# Patient Record
Sex: MALE | Race: WHITE | URBAN - METROPOLITAN AREA
[De-identification: names, ages, dates, MRNs, and addresses within clinical notes are randomized per-mention and may not be internally consistent; named-entity substitution may affect disease eponyms.]

---

## 2020-08-02 ENCOUNTER — INPATIENT (INPATIENT)
Facility: HOSPITAL | Age: 55
LOS: 1 days | Discharge: HOME | DRG: 603 | End: 2020-08-04
Attending: FAMILY MEDICINE | Admitting: FAMILY MEDICINE
Payer: COMMERCIAL

## 2020-08-02 VITALS — WEIGHT: 190.04 LBS | HEIGHT: 70 IN

## 2020-08-02 DIAGNOSIS — L03.114 CELLULITIS OF LEFT UPPER LIMB: ICD-10-CM

## 2020-08-02 LAB
ALBUMIN SERPL ELPH-MCNC: 4.1 G/DL — SIGNIFICANT CHANGE UP (ref 3.3–5)
ALP SERPL-CCNC: 68 U/L — SIGNIFICANT CHANGE UP (ref 40–120)
ALT FLD-CCNC: 63 U/L — SIGNIFICANT CHANGE UP (ref 12–78)
ANION GAP SERPL CALC-SCNC: 7 MMOL/L — SIGNIFICANT CHANGE UP (ref 5–17)
APTT BLD: 37.6 SEC — HIGH (ref 27.5–35.5)
AST SERPL-CCNC: 26 U/L — SIGNIFICANT CHANGE UP (ref 15–37)
BASOPHILS # BLD AUTO: 0.03 K/UL — SIGNIFICANT CHANGE UP (ref 0–0.2)
BASOPHILS NFR BLD AUTO: 0.2 % — SIGNIFICANT CHANGE UP (ref 0–2)
BILIRUB SERPL-MCNC: 0.9 MG/DL — SIGNIFICANT CHANGE UP (ref 0.2–1.2)
BUN SERPL-MCNC: 17 MG/DL — SIGNIFICANT CHANGE UP (ref 7–23)
CALCIUM SERPL-MCNC: 9 MG/DL — SIGNIFICANT CHANGE UP (ref 8.5–10.1)
CHLORIDE SERPL-SCNC: 103 MMOL/L — SIGNIFICANT CHANGE UP (ref 96–108)
CO2 SERPL-SCNC: 27 MMOL/L — SIGNIFICANT CHANGE UP (ref 22–31)
CREAT SERPL-MCNC: 1.17 MG/DL — SIGNIFICANT CHANGE UP (ref 0.5–1.3)
EOSINOPHIL # BLD AUTO: 0.15 K/UL — SIGNIFICANT CHANGE UP (ref 0–0.5)
EOSINOPHIL NFR BLD AUTO: 1.1 % — SIGNIFICANT CHANGE UP (ref 0–6)
ERYTHROCYTE [SEDIMENTATION RATE] IN BLOOD: 7 MM/HR — SIGNIFICANT CHANGE UP (ref 0–20)
GLUCOSE SERPL-MCNC: 92 MG/DL — SIGNIFICANT CHANGE UP (ref 70–99)
HCT VFR BLD CALC: 44.5 % — SIGNIFICANT CHANGE UP (ref 39–50)
HGB BLD-MCNC: 15.4 G/DL — SIGNIFICANT CHANGE UP (ref 13–17)
IMM GRANULOCYTES NFR BLD AUTO: 0.4 % — SIGNIFICANT CHANGE UP (ref 0–1.5)
INR BLD: 1.04 RATIO — SIGNIFICANT CHANGE UP (ref 0.88–1.16)
LACTATE SERPL-SCNC: 1.3 MMOL/L — SIGNIFICANT CHANGE UP (ref 0.7–2)
LYMPHOCYTES # BLD AUTO: 1.65 K/UL — SIGNIFICANT CHANGE UP (ref 1–3.3)
LYMPHOCYTES # BLD AUTO: 11.9 % — LOW (ref 13–44)
MCHC RBC-ENTMCNC: 31.5 PG — SIGNIFICANT CHANGE UP (ref 27–34)
MCHC RBC-ENTMCNC: 34.6 GM/DL — SIGNIFICANT CHANGE UP (ref 32–36)
MCV RBC AUTO: 91 FL — SIGNIFICANT CHANGE UP (ref 80–100)
MONOCYTES # BLD AUTO: 1 K/UL — HIGH (ref 0–0.9)
MONOCYTES NFR BLD AUTO: 7.2 % — SIGNIFICANT CHANGE UP (ref 2–14)
NEUTROPHILS # BLD AUTO: 10.95 K/UL — HIGH (ref 1.8–7.4)
NEUTROPHILS NFR BLD AUTO: 79.2 % — HIGH (ref 43–77)
PLATELET # BLD AUTO: 206 K/UL — SIGNIFICANT CHANGE UP (ref 150–400)
POTASSIUM SERPL-MCNC: 4.5 MMOL/L — SIGNIFICANT CHANGE UP (ref 3.5–5.3)
POTASSIUM SERPL-SCNC: 4.5 MMOL/L — SIGNIFICANT CHANGE UP (ref 3.5–5.3)
PROT SERPL-MCNC: 7.6 GM/DL — SIGNIFICANT CHANGE UP (ref 6–8.3)
PROTHROM AB SERPL-ACNC: 12.1 SEC — SIGNIFICANT CHANGE UP (ref 10.6–13.6)
RBC # BLD: 4.89 M/UL — SIGNIFICANT CHANGE UP (ref 4.2–5.8)
RBC # FLD: 11.8 % — SIGNIFICANT CHANGE UP (ref 10.3–14.5)
SARS-COV-2 RNA SPEC QL NAA+PROBE: SIGNIFICANT CHANGE UP
SODIUM SERPL-SCNC: 137 MMOL/L — SIGNIFICANT CHANGE UP (ref 135–145)
WBC # BLD: 13.83 K/UL — HIGH (ref 3.8–10.5)
WBC # FLD AUTO: 13.83 K/UL — HIGH (ref 3.8–10.5)

## 2020-08-02 PROCEDURE — 73130 X-RAY EXAM OF HAND: CPT | Mod: 26,LT

## 2020-08-02 PROCEDURE — 80048 BASIC METABOLIC PNL TOTAL CA: CPT

## 2020-08-02 PROCEDURE — 93010 ELECTROCARDIOGRAM REPORT: CPT

## 2020-08-02 PROCEDURE — 85027 COMPLETE CBC AUTOMATED: CPT

## 2020-08-02 PROCEDURE — 71045 X-RAY EXAM CHEST 1 VIEW: CPT | Mod: 26

## 2020-08-02 PROCEDURE — 99223 1ST HOSP IP/OBS HIGH 75: CPT

## 2020-08-02 PROCEDURE — 86803 HEPATITIS C AB TEST: CPT

## 2020-08-02 PROCEDURE — 36415 COLL VENOUS BLD VENIPUNCTURE: CPT

## 2020-08-02 PROCEDURE — 85025 COMPLETE CBC W/AUTO DIFF WBC: CPT

## 2020-08-02 RX ORDER — DEXTROAMPHETAMINE SACCHARATE, AMPHETAMINE ASPARTATE, DEXTROAMPHETAMINE SULFATE AND AMPHETAMINE SULFATE 1.875; 1.875; 1.875; 1.875 MG/1; MG/1; MG/1; MG/1
1 TABLET ORAL
Qty: 0 | Refills: 0 | DISCHARGE

## 2020-08-02 RX ORDER — ACETAMINOPHEN 500 MG
650 TABLET ORAL ONCE
Refills: 0 | Status: COMPLETED | OUTPATIENT
Start: 2020-08-02 | End: 2020-08-02

## 2020-08-02 RX ORDER — ARIPIPRAZOLE 15 MG/1
1 TABLET ORAL
Qty: 0 | Refills: 0 | DISCHARGE

## 2020-08-02 RX ORDER — AMPICILLIN SODIUM AND SULBACTAM SODIUM 250; 125 MG/ML; MG/ML
3 INJECTION, POWDER, FOR SUSPENSION INTRAMUSCULAR; INTRAVENOUS ONCE
Refills: 0 | Status: COMPLETED | OUTPATIENT
Start: 2020-08-02 | End: 2020-08-02

## 2020-08-02 RX ORDER — FLUOXETINE HCL 10 MG
1 CAPSULE ORAL
Qty: 0 | Refills: 0 | DISCHARGE

## 2020-08-02 RX ORDER — AMPICILLIN SODIUM AND SULBACTAM SODIUM 250; 125 MG/ML; MG/ML
3 INJECTION, POWDER, FOR SUSPENSION INTRAMUSCULAR; INTRAVENOUS EVERY 6 HOURS
Refills: 0 | Status: DISCONTINUED | OUTPATIENT
Start: 2020-08-02 | End: 2020-08-04

## 2020-08-02 RX ORDER — HEPARIN SODIUM 5000 [USP'U]/ML
5000 INJECTION INTRAVENOUS; SUBCUTANEOUS EVERY 8 HOURS
Refills: 0 | Status: DISCONTINUED | OUTPATIENT
Start: 2020-08-02 | End: 2020-08-04

## 2020-08-02 RX ADMIN — AMPICILLIN SODIUM AND SULBACTAM SODIUM 3 GRAM(S): 250; 125 INJECTION, POWDER, FOR SUSPENSION INTRAMUSCULAR; INTRAVENOUS at 16:43

## 2020-08-02 RX ADMIN — Medication 650 MILLIGRAM(S): at 22:30

## 2020-08-02 RX ADMIN — Medication 650 MILLIGRAM(S): at 21:22

## 2020-08-02 RX ADMIN — AMPICILLIN SODIUM AND SULBACTAM SODIUM 200 GRAM(S): 250; 125 INJECTION, POWDER, FOR SUSPENSION INTRAMUSCULAR; INTRAVENOUS at 23:33

## 2020-08-02 RX ADMIN — AMPICILLIN SODIUM AND SULBACTAM SODIUM 200 GRAM(S): 250; 125 INJECTION, POWDER, FOR SUSPENSION INTRAMUSCULAR; INTRAVENOUS at 16:12

## 2020-08-02 RX ADMIN — HEPARIN SODIUM 5000 UNIT(S): 5000 INJECTION INTRAVENOUS; SUBCUTANEOUS at 21:24

## 2020-08-02 NOTE — H&P ADULT - ASSESSMENT
56 yo male with Hx. of depression, who was bitten on L hand by his ped cat 4 days ago. He developed  redness , pain , swelling  on the L forearm  and got worse today.  assessment Dx:                       1. L arm cellulitis                       2. cat bite                       3. Anxiety                       4. ADHD    Plan:    admit to medicine                medications: started on Unasyn, keep L arm elevated on pillow.               VTEP: heparin               Labs: cbc,cmp               Radiology:                Cardiac diagnostics: EKG               Consults: ID

## 2020-08-02 NOTE — ED ADULT TRIAGE NOTE - CHIEF COMPLAINT QUOTE
Patient comes in with cat bite on left hand. Patient p/w swelling, pain, redness, and generalized fatigue since cat bite on Wednesday. Patient denies fevers.

## 2020-08-02 NOTE — H&P ADULT - HISTORY OF PRESENT ILLNESS
HPI: The patient is a 54 yo male with Hx. of depression, who was bitten on L hand by his ped cat 4 days ago. He developed  redness , pain , swelling  on the L forear and got worse today.    PMHx: bipolar disorder,     PSHx: denies surgeries in the past.     Family Hx: father  from renal failure ,mother is 86 yo and had diabetes.    Social Hx.: not smoking, no alcohol use    ROS:   Eyes: no changes in vision    ENT/Mouth: no changes    Cardiovascular: no chest pain    Respiratory: no SOB    Gastrointestinal: no diarrhea, no nausea, no vomiting    Genitourinary: no dysuria    Breast: no pain    Musculoskeletal: no pain    Integumentary:  has  rash, swelling itching L forearm    Neurological: No Headache, no tremor,    Psychiatric: no suicidal ideations    Endocrine: no excessive thirst,     Hematologic/Lymphatic: no swollen glands    Allergic/Immunologic: no itching      Physical Exam: Vital Signs Last 24 Hrs  T(C): 37.2 (02 Aug 2020 21:03), Max: 37.4 (02 Aug 2020 18:29)  T(F): 99 (02 Aug 2020 21:03), Max: 99.3 (02 Aug 2020 18:29)  HR: 76 (02 Aug 2020 21:03) (75 - 86)  BP: 123/78 (02 Aug 2020 21:03) (114/74 - 123/78)  BP(mean): 94 (02 Aug 2020 14:49) (94 - 94)  RR: 18 (02 Aug 2020 21:03) (18 - 18)  SpO2: 98% (02 Aug 2020 21:03) (98% - 98%)        HEENT: PRRL EOMI    MOUTH/TEETH/GUMS: Clear    NECK: no JVD    LUNGS: Clear    HEART: S1,S2 RR    ABDOMEN: soft nontender    EXTREMITIES:  L hand bite will, swelling, redness going up on L forearm,  no pedal edema    MUSCULOSKELETAL: no joint swelling     NEURO: no tremor, no focal signs.    SKIN: no rash    : CVA negative,     Lab:                       15.4   13.83 )-----------( 206      ( 02 Aug 2020 15:54 )             44.5       08-02    137  |  103  |  17  ----------------------------<  92  4.5   |  27  |  1.17    Ca    9.0      02 Aug 2020 15:54    TPro  7.6  /  Alb  4.1  /  TBili  0.9  /  DBili  x   /  AST  26  /  ALT  63  /  AlkPhos  68   HPI: The patient is a 54 yo male with Hx. of depression, who was bitten on L hand by his ped cat 4 days ago. He developed  redness , pain , swelling  on the L forear and got worse today.    PMHx: bipolar disorder,  He had Tetanus shot given by  his PCP this year.    PSHx: denies surgeries in the past.     Family Hx: father  from renal failure ,mother is 86 yo and had diabetes.    Social Hx.: not smoking, no alcohol use    ROS:   Eyes: no changes in vision    ENT/Mouth: no changes    Cardiovascular: no chest pain    Respiratory: no SOB    Gastrointestinal: no diarrhea, no nausea, no vomiting    Genitourinary: no dysuria    Breast: no pain    Musculoskeletal: no pain    Integumentary:  has  rash, swelling itching L forearm    Neurological: No Headache, no tremor,    Psychiatric: no suicidal ideations    Endocrine: no excessive thirst,     Hematologic/Lymphatic: no swollen glands    Allergic/Immunologic: no itching      Physical Exam: Vital Signs Last 24 Hrs  T(C): 37.2 (02 Aug 2020 21:03), Max: 37.4 (02 Aug 2020 18:29)  T(F): 99 (02 Aug 2020 21:03), Max: 99.3 (02 Aug 2020 18:29)  HR: 76 (02 Aug 2020 21:03) (75 - 86)  BP: 123/78 (02 Aug 2020 21:03) (114/74 - 123/78)  BP(mean): 94 (02 Aug 2020 14:49) (94 - 94)  RR: 18 (02 Aug 2020 21:03) (18 - 18)  SpO2: 98% (02 Aug 2020 21:03) (98% - 98%)        HEENT: PRRL EOMI    MOUTH/TEETH/GUMS: Clear    NECK: no JVD    LUNGS: Clear    HEART: S1,S2 RR    ABDOMEN: soft nontender    EXTREMITIES:  L hand bite will, swelling, redness going up on L forearm,  no pedal edema    MUSCULOSKELETAL: no joint swelling     NEURO: no tremor, no focal signs.    SKIN: no rash    : CVA negative,     Lab:                       15.4   13.83 )-----------( 206      ( 02 Aug 2020 15:54 )             44.5       08-02    137  |  103  |  17  ----------------------------<  92  4.5   |  27  |  1.17    Ca    9.0      02 Aug 2020 15:54    TPro  7.6  /  Alb  4.1  /  TBili  0.9  /  DBili  x   /  AST  26  /  ALT  63  /  AlkPhos  68  -

## 2020-08-02 NOTE — ED STATDOCS - CLINICAL SUMMARY MEDICAL DECISION MAKING FREE TEXT BOX
Plan IV Abx, labs and reassessment. Will discuss possibility of infection. Plan IV Abx, labs and reassessment. Will discuss possibility of admission for worsening infection.

## 2020-08-02 NOTE — ED STATDOCS - OBJECTIVE STATEMENT
55 YOM no PMHx presents to the ED c/o progressively worsening redness, swelling and pain s/p pet cat bite 7/29. Cat bite to left hypothenar eminence. Pt is not on any Abx. Evaluated at urgent care today and sent to [facility] for further care and evaluation. Cat's vaccinations are UTD. Pt additionally presenting with fatigue and "feeling dizzy." No fevers. Denies CP, SOB. No cough.

## 2020-08-02 NOTE — ED ADULT NURSE NOTE - OBJECTIVE STATEMENT
Pt comes in for cat bite to left hand from Wedensday. Complains of pain, redness, and swelling. denies fever

## 2020-08-02 NOTE — CONSULT NOTE ADULT - SUBJECTIVE AND OBJECTIVE BOX
55M with no PMH presents to the ED following a cat bite to the L hypothenar eminence. The pt states that his own cat bite him after trying to grab it on 7/29. Since then the pt has felt pain and noticed redness in the area of the hypothenar eminence, getting progressively worse. He did not take antibiotics prior to coming to the ED. 55M with no PMH presents to the ED following a cat bite to the L hypothenar eminence. The pt states that his own cat bite him after trying to grab it on 7/29. Since then the pt has felt pain and noticed redness in the area of the hypothenar eminence, getting progressively worse. He did not take antibiotics prior to coming to the ED. Pt states that the redness is progressing up the arm. Pt denies fever, chills, N/V.    PAST MEDICAL & SURGICAL HISTORY:  Denies    Allergies    No Known Allergies    Vital Signs Last 24 Hrs  T(C): 37.4 (02 Aug 2020 18:29), Max: 37.4 (02 Aug 2020 18:29)  T(F): 99.3 (02 Aug 2020 18:29), Max: 99.3 (02 Aug 2020 18:29)  HR: 75 (02 Aug 2020 18:29) (75 - 86)  BP: 114/74 (02 Aug 2020 18:29) (114/74 - 120/83)  BP(mean): 94 (02 Aug 2020 14:49) (94 - 94)  RR: 18 (02 Aug 2020 18:29) (18 - 18)  SpO2: 98% (02 Aug 2020 18:29) (98% - 98%)    PE:  Gen: NAD  LUE: Scratch marks and small healed bite marks over the hypothenar eminence all healed without purulence, no expression of fluid  Redness and swelling over the hypothenar eminence, that is tracking up the forearm  TTP over the hypothenar eminence  No TTP of the flexor sheath  AROM of digits without pain or restriction  PROM without pain  Sensation intact of ulnar and radial side of digits 4-5  Brisk cap refill  Compartments soft and compressible    XR L Hand: No fractures or dislocations present, no evidence of effusion, possible edema

## 2020-08-02 NOTE — ED STATDOCS - CARE PLAN
Principal Discharge DX:	Cellulitis of hand, left  Secondary Diagnosis:	Ascending lymphangitis  Secondary Diagnosis:	Cat bite of hand, left, initial encounter

## 2020-08-02 NOTE — ED ADULT NURSE NOTE - NSIMPLEMENTINTERV_GEN_ALL_ED
Implemented All Universal Safety Interventions:  Index to call system. Call bell, personal items and telephone within reach. Instruct patient to call for assistance. Room bathroom lighting operational. Non-slip footwear when patient is off stretcher. Physically safe environment: no spills, clutter or unnecessary equipment. Stretcher in lowest position, wheels locked, appropriate side rails in place.

## 2020-08-02 NOTE — ED STATDOCS - SKIN, MLM
+erythema from hypothenar eminence extending down to distal part of left forearm overlying the ulna, mild TTP, not fluctuant, no discharge, does have central punctum representing bite wound

## 2020-08-02 NOTE — ED STATDOCS - PROGRESS NOTE DETAILS
signed Lindsey Wisdom PA-C Pt seen initially in intake by Dr Freeman.   55M c/o redness, pain and swelling where his cat bit his left hand on 7/29. Pt states he has an indoor cat, utd on vaccines which tried to run outside, he grabbed the cat to keep it inside and it bit his left hand. No abx, has not seen a doctor yet. Tetanus up to date. PMH anxiety, depression. Denies fever. pt alert, NAD, +erythema and mild swelling of left hypothenar eminence with ascending lymphangitis to level of bicep. PMD Strogach. I spoke to hand Dr Valdez who recommends admission to medicine for IV abx and ortho resident will consult on pt in ED. Pt agrees with admission and plan of care. signed Lindsey Wisdom PA-C   Elevated WBC. No significant findings on xray. Case discussed with and admission accepted by hospitalist Dr. Lay. pt seen in ED by ortho residents. agree with admission and IV abx.

## 2020-08-03 LAB
ANION GAP SERPL CALC-SCNC: 7 MMOL/L — SIGNIFICANT CHANGE UP (ref 5–17)
BASOPHILS # BLD AUTO: 0.02 K/UL — SIGNIFICANT CHANGE UP (ref 0–0.2)
BASOPHILS NFR BLD AUTO: 0.2 % — SIGNIFICANT CHANGE UP (ref 0–2)
BUN SERPL-MCNC: 15 MG/DL — SIGNIFICANT CHANGE UP (ref 7–23)
CALCIUM SERPL-MCNC: 8.7 MG/DL — SIGNIFICANT CHANGE UP (ref 8.5–10.1)
CHLORIDE SERPL-SCNC: 104 MMOL/L — SIGNIFICANT CHANGE UP (ref 96–108)
CO2 SERPL-SCNC: 28 MMOL/L — SIGNIFICANT CHANGE UP (ref 22–31)
CREAT SERPL-MCNC: 1.18 MG/DL — SIGNIFICANT CHANGE UP (ref 0.5–1.3)
CRP SERPL-MCNC: 3.95 MG/DL — HIGH (ref 0–0.4)
EOSINOPHIL # BLD AUTO: 0.08 K/UL — SIGNIFICANT CHANGE UP (ref 0–0.5)
EOSINOPHIL NFR BLD AUTO: 0.7 % — SIGNIFICANT CHANGE UP (ref 0–6)
GLUCOSE SERPL-MCNC: 100 MG/DL — HIGH (ref 70–99)
HCT VFR BLD CALC: 46.7 % — SIGNIFICANT CHANGE UP (ref 39–50)
HCV AB S/CO SERPL IA: 0.09 S/CO — SIGNIFICANT CHANGE UP (ref 0–0.99)
HCV AB SERPL-IMP: SIGNIFICANT CHANGE UP
HGB BLD-MCNC: 15.9 G/DL — SIGNIFICANT CHANGE UP (ref 13–17)
IMM GRANULOCYTES NFR BLD AUTO: 0.4 % — SIGNIFICANT CHANGE UP (ref 0–1.5)
LYMPHOCYTES # BLD AUTO: 1.81 K/UL — SIGNIFICANT CHANGE UP (ref 1–3.3)
LYMPHOCYTES # BLD AUTO: 16.3 % — SIGNIFICANT CHANGE UP (ref 13–44)
MCHC RBC-ENTMCNC: 31.1 PG — SIGNIFICANT CHANGE UP (ref 27–34)
MCHC RBC-ENTMCNC: 34 GM/DL — SIGNIFICANT CHANGE UP (ref 32–36)
MCV RBC AUTO: 91.2 FL — SIGNIFICANT CHANGE UP (ref 80–100)
MONOCYTES # BLD AUTO: 0.71 K/UL — SIGNIFICANT CHANGE UP (ref 0–0.9)
MONOCYTES NFR BLD AUTO: 6.4 % — SIGNIFICANT CHANGE UP (ref 2–14)
NEUTROPHILS # BLD AUTO: 8.44 K/UL — HIGH (ref 1.8–7.4)
NEUTROPHILS NFR BLD AUTO: 76 % — SIGNIFICANT CHANGE UP (ref 43–77)
PLATELET # BLD AUTO: 216 K/UL — SIGNIFICANT CHANGE UP (ref 150–400)
POTASSIUM SERPL-MCNC: 4.2 MMOL/L — SIGNIFICANT CHANGE UP (ref 3.5–5.3)
POTASSIUM SERPL-SCNC: 4.2 MMOL/L — SIGNIFICANT CHANGE UP (ref 3.5–5.3)
RBC # BLD: 5.12 M/UL — SIGNIFICANT CHANGE UP (ref 4.2–5.8)
RBC # FLD: 11.8 % — SIGNIFICANT CHANGE UP (ref 10.3–14.5)
SARS-COV-2 IGG SERPL QL IA: NEGATIVE — SIGNIFICANT CHANGE UP
SARS-COV-2 IGM SERPL IA-ACNC: <0.1 INDEX — SIGNIFICANT CHANGE UP
SODIUM SERPL-SCNC: 139 MMOL/L — SIGNIFICANT CHANGE UP (ref 135–145)
WBC # BLD: 11.1 K/UL — HIGH (ref 3.8–10.5)
WBC # FLD AUTO: 11.1 K/UL — HIGH (ref 3.8–10.5)

## 2020-08-03 PROCEDURE — 99233 SBSQ HOSP IP/OBS HIGH 50: CPT

## 2020-08-03 RX ORDER — ACETAMINOPHEN 500 MG
650 TABLET ORAL EVERY 6 HOURS
Refills: 0 | Status: DISCONTINUED | OUTPATIENT
Start: 2020-08-03 | End: 2020-08-04

## 2020-08-03 RX ORDER — IBUPROFEN 200 MG
600 TABLET ORAL EVERY 8 HOURS
Refills: 0 | Status: DISCONTINUED | OUTPATIENT
Start: 2020-08-03 | End: 2020-08-04

## 2020-08-03 RX ADMIN — AMPICILLIN SODIUM AND SULBACTAM SODIUM 200 GRAM(S): 250; 125 INJECTION, POWDER, FOR SUSPENSION INTRAMUSCULAR; INTRAVENOUS at 23:30

## 2020-08-03 RX ADMIN — AMPICILLIN SODIUM AND SULBACTAM SODIUM 200 GRAM(S): 250; 125 INJECTION, POWDER, FOR SUSPENSION INTRAMUSCULAR; INTRAVENOUS at 11:18

## 2020-08-03 RX ADMIN — HEPARIN SODIUM 5000 UNIT(S): 5000 INJECTION INTRAVENOUS; SUBCUTANEOUS at 05:58

## 2020-08-03 RX ADMIN — AMPICILLIN SODIUM AND SULBACTAM SODIUM 200 GRAM(S): 250; 125 INJECTION, POWDER, FOR SUSPENSION INTRAMUSCULAR; INTRAVENOUS at 05:59

## 2020-08-03 RX ADMIN — HEPARIN SODIUM 5000 UNIT(S): 5000 INJECTION INTRAVENOUS; SUBCUTANEOUS at 22:18

## 2020-08-03 RX ADMIN — HEPARIN SODIUM 5000 UNIT(S): 5000 INJECTION INTRAVENOUS; SUBCUTANEOUS at 13:39

## 2020-08-03 RX ADMIN — AMPICILLIN SODIUM AND SULBACTAM SODIUM 200 GRAM(S): 250; 125 INJECTION, POWDER, FOR SUSPENSION INTRAMUSCULAR; INTRAVENOUS at 17:03

## 2020-08-03 NOTE — PROGRESS NOTE ADULT - SUBJECTIVE AND OBJECTIVE BOX
CC: Left arm cat bite. History of Present Illness: 	  HPI: The patient is a 56 yo male with Hx. of depression, who was bitten on L hand by his ped cat 4 days ago. He developed  redness , pain , swelling  on the L forear and got worse today.    8/3: Slow improvement in hand erythema and edema.  Pt comfortable, Denies fever, chills, N, V, abd pain, CP, SOB.    REVIEW OF SYSTEMS: All other review of systems is negative unless indicated above.    Vital Signs Last 24 Hrs  T(C): 37.2 (03 Aug 2020 09:51), Max: 37.4 (02 Aug 2020 18:29)  T(F): 99 (03 Aug 2020 09:51), Max: 99.3 (02 Aug 2020 18:29)  HR: 76 (03 Aug 2020 09:51) (75 - 86)  BP: 125/75 (03 Aug 2020 09:51) (114/74 - 125/75)  BP(mean): 94 (02 Aug 2020 14:49) (94 - 94)  RR: 18 (03 Aug 2020 09:51) (18 - 18)  SpO2: 94% (03 Aug 2020 09:51) (94% - 98%)    PHYSICAL EXAM:    Constitutional: NAD, awake and alert, well-developed  HEENT: PERR, EOMI, Normal Hearing, MMM  Neck: Soft and supple  Respiratory: Breath sounds are clear bilaterally, No wheezing, rales or rhonchi  Cardiovascular: S1 and S2, regular rate and rhythm, no Murmurs, gallops or rubs  Gastrointestinal: Bowel Sounds present, soft, nontender, nondistended, no guarding, no rebound  Extremities: left hand erythema, edema, tenderness  Neurological: A/O x 3, no focal deficits in my limited exam    MEDICATIONS  (STANDING):  ampicillin/sulbactam  IVPB 3 Gram(s) IV Intermittent every 6 hours  heparin   Injectable 5000 Unit(s) SubCutaneous every 8 hours    MEDICATIONS  (PRN):  acetaminophen   Tablet .. 650 milliGRAM(s) Oral every 6 hours PRN Temp greater or equal to 38C (100.4F), Mild Pain (1 - 3), Moderate Pain (4 - 6)  ibuprofen  Tablet. 600 milliGRAM(s) Oral every 8 hours PRN Severe Pain (7 - 10)                              15.9   11.10 )-----------( 216      ( 03 Aug 2020 07:47 )             46.7     08-03    139  |  104  |  15  ----------------------------<  100<H>  4.2   |  28  |  1.18    Ca    8.7      03 Aug 2020 07:47    TPro  7.6  /  Alb  4.1  /  TBili  0.9  /  DBili  x   /  AST  26  /  ALT  63  /  AlkPhos  68  08-02    CAPILLARY BLOOD GLUCOSE        LIVER FUNCTIONS - ( 02 Aug 2020 15:54 )  Alb: 4.1 g/dL / Pro: 7.6 gm/dL / ALK PHOS: 68 U/L / ALT: 63 U/L / AST: 26 U/L / GGT: x           PT/INR - ( 02 Aug 2020 15:54 )   PT: 12.1 sec;   INR: 1.04 ratio         PTT - ( 02 Aug 2020 15:54 )  PTT:37.6 sec        Assessment and Plan:  56 yo male with Hx. of depression, who was bitten on L hand by his ped cat 4 days ago. He developed  redness , pain , swelling  on the L forearm  and got worse today.    Left arm cellulitis due to cat bite:  -c/w IV unasyn  -f/u cultures  -ID evaluation  -xray hand no acute findings    Anxiety / ADHD:  -resume home meds    VTEP:   -heparin

## 2020-08-03 NOTE — PROGRESS NOTE ADULT - ASSESSMENT
A/P: 55M s/p Cat Bite to the L hypothenar eminence likely a local infection with possible ascending lymphangitis:      -pt doing well this am, with some improvement  -WBAT LUE  -Pain control  -Ice/Elevate to decrease swelling  -C/w IV antibiotics and monitoring at this time  -FU ID c/s, abx recs   -Marked out redness, monitor for change   -no surgical intervention at this time  -will continue to monitor symptoms  -Discussed plan with Dr Valdez and he agrees with the plan will advise if plan changes.

## 2020-08-03 NOTE — PROGRESS NOTE ADULT - SUBJECTIVE AND OBJECTIVE BOX
Pt seen and examined at bedside this am. pain is controlled, pt overall stating he feels the same as yesterday. denies fever/chill/night sweats. no other complaints at this time.         Vital Signs Last 24 Hrs  T(C): 36.6 (03 Aug 2020 00:22), Max: 37.4 (02 Aug 2020 18:29)  T(F): 97.8 (03 Aug 2020 00:22), Max: 99.3 (02 Aug 2020 18:29)  HR: 76 (02 Aug 2020 21:03) (75 - 86)  BP: 123/78 (02 Aug 2020 21:03) (114/74 - 123/78)  BP(mean): 94 (02 Aug 2020 14:49) (94 - 94)  RR: 18 (02 Aug 2020 21:03) (18 - 18)  SpO2: 98% (02 Aug 2020 21:03) (98% - 98%)      PE:     GEN: NAD     LUE: Scratch marks and small healed bite marks over the hypothenar eminence all healed without purulence, no expression of fluid  Redness and swelling over the hypothenar eminence, that is tracking up the forearm  TTP over the hypothenar eminence  No TTP of the flexor sheath  AROM of digits without pain or restriction  PROM without pain  Sensation intact of ulnar and radial side of digits 4-5  Brisk cap refill  Compartments soft and compressible    XR L Hand: No fractures or dislocations present, no evidence of effusion, possible edema

## 2020-08-03 NOTE — CONSULT NOTE ADULT - ASSESSMENT
56 yo male with Hx. of depression, who was bitten on L hand by his ped cat 4 days ago prior to admit on 8/2. He developed redness, pain, swelling on the L forearm that was noted to worsen over the past few days prompting admit to .  Here noted with wbc ct 13, was given IV unasyn.     1. L hand/forearm cellulitis with lymphangitis. s/p cat bite  - slowly improving  - agree with unasyn 3gmq6h  - f/u urine cx/blood cx  - monitor temps  - tolerating abx well so far; no side effects noted  - reason for abx use and side effects reviewed with patient  - supportive care  - fu cbc    2. other issues - care per medicine
55M s/p Cat Bite to the L hypothenar eminence likely a local infection with possible ascending lymphangitis:    -WBAT LUE  -Pain control  -Ice/Elevate to decrease swelling  -Advise admission for IV antibiotics and monitoring   -Marked out redness, monitor for change   -Discussed case and imaging with Dr Valdez  -Discussed plan with Dr Valdez and he agrees with the plan

## 2020-08-04 VITALS
TEMPERATURE: 98 F | OXYGEN SATURATION: 93 % | DIASTOLIC BLOOD PRESSURE: 63 MMHG | SYSTOLIC BLOOD PRESSURE: 100 MMHG | RESPIRATION RATE: 18 BRPM | HEART RATE: 73 BPM

## 2020-08-04 LAB
HCT VFR BLD CALC: 44.1 % — SIGNIFICANT CHANGE UP (ref 39–50)
HGB BLD-MCNC: 15.1 G/DL — SIGNIFICANT CHANGE UP (ref 13–17)
MCHC RBC-ENTMCNC: 30.8 PG — SIGNIFICANT CHANGE UP (ref 27–34)
MCHC RBC-ENTMCNC: 34.2 GM/DL — SIGNIFICANT CHANGE UP (ref 32–36)
MCV RBC AUTO: 90 FL — SIGNIFICANT CHANGE UP (ref 80–100)
PLATELET # BLD AUTO: 218 K/UL — SIGNIFICANT CHANGE UP (ref 150–400)
RBC # BLD: 4.9 M/UL — SIGNIFICANT CHANGE UP (ref 4.2–5.8)
RBC # FLD: 11.6 % — SIGNIFICANT CHANGE UP (ref 10.3–14.5)
WBC # BLD: 8.89 K/UL — SIGNIFICANT CHANGE UP (ref 3.8–10.5)
WBC # FLD AUTO: 8.89 K/UL — SIGNIFICANT CHANGE UP (ref 3.8–10.5)

## 2020-08-04 PROCEDURE — 99239 HOSP IP/OBS DSCHRG MGMT >30: CPT

## 2020-08-04 RX ORDER — IBUPROFEN 200 MG
1 TABLET ORAL
Qty: 0 | Refills: 0 | DISCHARGE
Start: 2020-08-04

## 2020-08-04 RX ORDER — POLYETHYLENE GLYCOL 3350 17 G/17G
17 POWDER, FOR SOLUTION ORAL ONCE
Refills: 0 | Status: COMPLETED | OUTPATIENT
Start: 2020-08-04 | End: 2020-08-04

## 2020-08-04 RX ADMIN — AMPICILLIN SODIUM AND SULBACTAM SODIUM 200 GRAM(S): 250; 125 INJECTION, POWDER, FOR SUSPENSION INTRAMUSCULAR; INTRAVENOUS at 05:50

## 2020-08-04 RX ADMIN — HEPARIN SODIUM 5000 UNIT(S): 5000 INJECTION INTRAVENOUS; SUBCUTANEOUS at 05:50

## 2020-08-04 RX ADMIN — POLYETHYLENE GLYCOL 3350 17 GRAM(S): 17 POWDER, FOR SOLUTION ORAL at 11:20

## 2020-08-04 NOTE — PROGRESS NOTE ADULT - SUBJECTIVE AND OBJECTIVE BOX
Pt seen and examined at bedside this am. pain is controlled, pt overall stating he feels the better this am. denies fever/chill/night sweats. no other complaints at this time.         Vital Signs Last 24 Hrs  T(C): 36.9 (04 Aug 2020 08:11), Max: 37.2 (03 Aug 2020 09:51)  T(F): 98.4 (04 Aug 2020 08:11), Max: 99 (03 Aug 2020 09:51)  HR: 73 (04 Aug 2020 08:11) (61 - 76)  BP: 100/63 (04 Aug 2020 08:11) (100/63 - 125/75)  BP(mean): --  RR: 18 (04 Aug 2020 08:11) (18 - 18)  SpO2: 93% (04 Aug 2020 08:11) (93% - 96%)      PE:     GEN: NAD     LUE: Scratch marks and small healed bite marks over the hypothenar eminence all healed without purulence, no expression of fluid  improved Redness and swelling over the hypothenar eminence, less tracking up the forearm this  morning  TTP over the hypothenar eminence  No TTP of the flexor sheath  AROM of digits without pain or restriction  PROM without pain  Sensation intact of ulnar and radial side of digits 4-5  Brisk cap refill  Compartments soft and compressible    XR L Hand: No fractures or dislocations present, no evidence of effusion, possible edema

## 2020-08-04 NOTE — PROGRESS NOTE ADULT - ASSESSMENT
A/P: 55M s/p Cat Bite to the L hypothenar eminence likely a local infection with possible ascending lymphangitis:      -pt doing well this am with improvement of redness/pain and swelling  -WBAT LUE  -Pain control  -Ice/Elevate to decrease swelling  -C/w IV antibiotics and monitoring at this time  -FU ID , final recs appreciated, DC Picc v PO abx per ID   -Marked out redness, improving   -no surgical intervention at this time  -ortho stable  -pt is improving  -will continue to monitor symptoms and advise if plan changes   -Discussed plan with Dr Valdez and he agrees with the plan will advise if plan changes.  -FU with Dr. Valdez when discharged from the hospital, call office for appointment

## 2020-08-04 NOTE — DISCHARGE NOTE PROVIDER - CARE PROVIDER_API CALL
Ritika Valdez  ORTHOPAEDIC SURGERY  [address] [place] [address]  Phone: [phone]  Fax: [phone]  Follow Up Time: 1 week

## 2020-08-04 NOTE — PROGRESS NOTE ADULT - SUBJECTIVE AND OBJECTIVE BOX
Date of service: 08-04-20 @ 10:40    pt seen and examined  feels better   L hand improving  afebrile     ROS: no fever or chills; denies dizziness, no HA, no SOB or cough, no abdominal pain, no diarrhea or constipation; no dysuria, no urinary frequency, no legs pain, no rashes    MEDICATIONS  (STANDING):  ampicillin/sulbactam  IVPB 3 Gram(s) IV Intermittent every 6 hours  heparin   Injectable 5000 Unit(s) SubCutaneous every 8 hours      Vital Signs Last 24 Hrs  T(C): 36.9 (04 Aug 2020 08:11), Max: 36.9 (04 Aug 2020 08:11)  T(F): 98.4 (04 Aug 2020 08:11), Max: 98.4 (04 Aug 2020 08:11)  HR: 73 (04 Aug 2020 08:11) (61 - 73)  BP: 100/63 (04 Aug 2020 08:11) (100/63 - 120/70)  BP(mean): --  RR: 18 (04 Aug 2020 08:11) (18 - 18)  SpO2: 93% (04 Aug 2020 08:11) (93% - 96%)    PE:  Constitutional: NAD  HEENT: NC/AT, EOMI, PERRLA, conjunctivae clear; ears and nose atraumatic; pharynx benign  Neck: supple; thyroid not palpable  Back: no tenderness  Respiratory: decreased breath sounds   Cardiovascular: S1S2 regular, no murmurs  Abdomen: soft, not tender, not distended, positive BS; liver and spleen WNL  Genitourinary: no suprapubic tenderness  Lymphatic: no LN palpable  Musculoskeletal: no muscle tenderness, no joint swelling or tenderness  Extremities: no pedal edema, L hand puncture wound with resolving erythema/warmth/tenderness  Neurological/ Psychiatric: AxOx3, Judgement and insight normal;  moving all extremities  Skin: no rashes; no palpable lesions    Labs: all available labs reviewed                                   15.1   8.89  )-----------( 218      ( 04 Aug 2020 08:25 )             44.1     08-03    139  |  104  |  15  ----------------------------<  100<H>  4.2   |  28  |  1.18    Ca    8.7      03 Aug 2020 07:47    TPro  7.6  /  Alb  4.1  /  TBili  0.9  /  DBili  x   /  AST  26  /  ALT  63  /  AlkPhos  68  08-02       Culture - Blood (08.02.20 @ 15:54)    Specimen Source: .Blood None    Culture Results:   No growth to date.      Radiology: all available radiological tests reviewed    EXAM:  XR CHEST 1 VIEW                            PROCEDURE DATE:  08/02/2020          INTERPRETATION:  cough    A frontal chest film  demonstrates grossly clear lungs.  No acute infiltrate or consolidation is  noted. The costophrenic angles are grossly clear.    The heart size and vascular markings are within normal limits for technique.    No mediastinal or hilar adenopathy is suggested. .    The osseous structures appear intact intact.    IMPRESSION:  Clear lungs.  No acute airspace disease suggested.        Advanced directives addressed: full resuscitation

## 2020-08-04 NOTE — PROGRESS NOTE ADULT - REASON FOR ADMISSION
Left arm cellulitis s/p cat bite

## 2020-08-04 NOTE — DISCHARGE NOTE PROVIDER - HOSPITAL COURSE
CC: Left arm cat bite.    History of Present Illness:     HPI: The patient is a 56 yo male with Hx. of depression, who was bitten on L hand by his ped cat 4 days ago. He developed  redness , pain , swelling  on the L forear and got worse today.        8/3: Slow improvement in hand erythema and edema.  Pt comfortable, Denies fever, chills, N, V, abd pain, CP, SOB.    8/4: Erythema, swelling greatly improving.  Xray negative for deeper infection.  Orthopedic cleared for discharge with outpatient follow up and antibiotic management.        REVIEW OF SYSTEMS: All other review of systems is negative unless indicated above.        Vital Signs Last 24 Hrs    T(C): 36.9 (04 Aug 2020 08:11), Max: 36.9 (04 Aug 2020 08:11)    T(F): 98.4 (04 Aug 2020 08:11), Max: 98.4 (04 Aug 2020 08:11)    HR: 73 (04 Aug 2020 08:11) (61 - 73)    BP: 100/63 (04 Aug 2020 08:11) (100/63 - 120/70)    BP(mean): --    RR: 18 (04 Aug 2020 08:11) (18 - 18)    SpO2: 93% (04 Aug 2020 08:11) (93% - 96%)        PHYSICAL EXAM:        Constitutional: NAD, awake and alert, well-developed    HEENT: PERR, EOMI, Normal Hearing, MMM    Neck: Soft and supple    Respiratory: Breath sounds are clear bilaterally, No wheezing, rales or rhonchi    Cardiovascular: S1 and S2, regular rate and rhythm, no Murmurs, gallops or rubs    Gastrointestinal: Bowel Sounds present, soft, nontender, nondistended, no guarding, no rebound    Extremities: left hand erythema, edema improving    Neurological: A/O x 3, no focal deficits in my limited exam        med/labs: Reviewed and interpreted         Assessment and Plan:  56 yo male with Hx. of depression, who was bitten on L hand by his ped cat 4 days ago. He developed  redness , pain , swelling  on the L forearm  and got worse today.        Left arm cellulitis due to cat bite: IMPROVING    -c/w IV unasyn and change to augmentin upon discharge to complete course per ID.    -cultures No growth    -xray hand no acute findings    -outpatient orthopedic f/u Dr. Dagly        Anxiety / ADHD:    -resume home meds        Attending Statement: 40 minutes spent on total encounter and discharge planning.

## 2020-08-04 NOTE — DISCHARGE NOTE PROVIDER - NSDCMRMEDTOKEN_GEN_ALL_CORE_FT
Abilify 2 mg oral tablet: 1 tab(s) orally once a day  Adderall XR 10 mg oral capsule, extended release: 1 cap(s) orally once a day (in the morning)  Augmentin 875 mg-125 mg oral tablet: 1  orally 2 times a day   ibuprofen 600 mg oral tablet: 1 tab(s) orally every 8 hours, As needed, Severe Pain (7 - 10)  PROzac 40 mg oral capsule: 1 cap(s) orally once a day

## 2020-08-04 NOTE — DISCHARGE NOTE PROVIDER - NSDCCPCAREPLAN_GEN_ALL_CORE_FT
PRINCIPAL DISCHARGE DIAGNOSIS  Diagnosis: Cellulitis of hand, left  Assessment and Plan of Treatment: due to cat bite = improving.  take augmentin as prescribed (sent to [facility]).

## 2020-08-04 NOTE — DISCHARGE NOTE NURSING/CASE MANAGEMENT/SOCIAL WORK - PATIENT PORTAL LINK FT
You can access the FollowMyHealth Patient Portal offered by [facility] by registering at the following website: [email or web address]. By joining [facility]’s FollowMyHealth portal, you will also be able to view your health information using other applications (apps) compatible with our system.

## 2020-08-04 NOTE — PROGRESS NOTE ADULT - ASSESSMENT
56 yo male with Hx. of depression, who was bitten on L hand by his ped cat 4 days ago prior to admit on 8/2. He developed redness, pain, swelling on the L forearm that was noted to worsen over the past few days prompting admit to .  Here noted with wbc ct 13, was given IV unasyn.     1. L hand/forearm cellulitis with lymphangitis. s/p cat bite  - slowly improving  - on unasyn 3gmq6h #2  - continue with abx coverage   - urine cx/blood cx no growth   - monitor temps  - tolerating abx well so far; no side effects noted  - reason for abx use and side effects reviewed with patient  - dc with po augmentin complete 7-10 day course   - supportive care  - fu cbc    2. other issues - care per medicine
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
## 2020-08-06 DIAGNOSIS — F31.9 BIPOLAR DISORDER, UNSPECIFIED: ICD-10-CM

## 2020-08-06 DIAGNOSIS — F90.9 ATTENTION-DEFICIT HYPERACTIVITY DISORDER, UNSPECIFIED TYPE: ICD-10-CM

## 2020-08-06 DIAGNOSIS — S60.572A OTHER SUPERFICIAL BITE OF HAND OF LEFT HAND, INITIAL ENCOUNTER: ICD-10-CM

## 2020-08-06 DIAGNOSIS — F32.9 MAJOR DEPRESSIVE DISORDER, SINGLE EPISODE, UNSPECIFIED: ICD-10-CM
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
## 2020-08-06 DIAGNOSIS — F41.9 ANXIETY DISORDER, UNSPECIFIED: ICD-10-CM

## 2020-08-06 DIAGNOSIS — L03.114 CELLULITIS OF LEFT UPPER LIMB: ICD-10-CM

## 2020-08-07 LAB
CULTURE RESULTS: SIGNIFICANT CHANGE UP
SPECIMEN SOURCE: SIGNIFICANT CHANGE UP

## 2022-02-18 NOTE — DISCHARGE NOTE NURSING/CASE MANAGEMENT/SOCIAL WORK - NSDCVIVACCINE_GEN_ALL_CORE_FT
No Vaccines Administered. Detail Level: Zone General Sunscreen Counseling: Reviewed sun protection including SPF 30 or higher, reapplication every 2 hours while in direct sunlight, sunglasses, wide brim hats, UPF clothing.